# Patient Record
Sex: MALE | Race: OTHER | Employment: UNEMPLOYED
[De-identification: names, ages, dates, MRNs, and addresses within clinical notes are randomized per-mention and may not be internally consistent; named-entity substitution may affect disease eponyms.]

---

## 2024-06-16 ENCOUNTER — HOSPITAL ENCOUNTER (EMERGENCY)
Facility: HOSPITAL | Age: 39
Discharge: HOME | End: 2024-06-17
Attending: EMERGENCY MEDICINE

## 2024-06-16 ENCOUNTER — APPOINTMENT (OUTPATIENT)
Facility: HOSPITAL | Age: 39
End: 2024-06-16

## 2024-06-16 VITALS
TEMPERATURE: 98.6 F | WEIGHT: 171.96 LBS | DIASTOLIC BLOOD PRESSURE: 77 MMHG | OXYGEN SATURATION: 98 % | SYSTOLIC BLOOD PRESSURE: 124 MMHG | HEART RATE: 63 BPM | RESPIRATION RATE: 17 BRPM

## 2024-06-16 DIAGNOSIS — S83.91XA SPRAIN OF RIGHT KNEE, UNSPECIFIED LIGAMENT, INITIAL ENCOUNTER: Primary | ICD-10-CM

## 2024-06-16 PROCEDURE — 99283 EMERGENCY DEPT VISIT LOW MDM: CPT

## 2024-06-16 PROCEDURE — 73562 X-RAY EXAM OF KNEE 3: CPT

## 2024-06-16 ASSESSMENT — PAIN SCALES - GENERAL: PAINLEVEL_OUTOF10: 8

## 2024-06-16 ASSESSMENT — LIFESTYLE VARIABLES
HOW OFTEN DO YOU HAVE A DRINK CONTAINING ALCOHOL: NEVER
HOW MANY STANDARD DRINKS CONTAINING ALCOHOL DO YOU HAVE ON A TYPICAL DAY: PATIENT DOES NOT DRINK

## 2024-06-16 ASSESSMENT — PAIN - FUNCTIONAL ASSESSMENT: PAIN_FUNCTIONAL_ASSESSMENT: 0-10

## 2024-06-16 ASSESSMENT — PAIN DESCRIPTION - ORIENTATION: ORIENTATION: RIGHT

## 2024-06-16 ASSESSMENT — PAIN DESCRIPTION - LOCATION: LOCATION: KNEE;ANKLE

## 2024-06-17 RX ORDER — ETODOLAC 200 MG/1
200 CAPSULE ORAL 2 TIMES DAILY
Qty: 10 CAPSULE | Refills: 0 | Status: SHIPPED | OUTPATIENT
Start: 2024-06-17 | End: 2024-06-22

## 2024-06-17 RX ORDER — CYCLOBENZAPRINE HCL 5 MG
10 TABLET ORAL 3 TIMES DAILY PRN
Qty: 20 TABLET | Refills: 0 | Status: SHIPPED | OUTPATIENT
Start: 2024-06-17 | End: 2024-06-22

## 2024-06-17 NOTE — DISCHARGE INSTRUCTIONS
Routine appointments for health maintenance with a primary care provider are very important and emergency department visits are no substitute.  You should review all findings and test results from your visit today with your primary care physician.        We recommended that you take medications as prescribed.    You may take 1 or 2 pills of Tylenol every 6 hours as needed for pain or fever.  You should not take this medication with other medications that contain acetaminophen/Tylenol which could include prescription pain medications or other combo over-the-counter medications.  You should not exceed more than 4000 mg in a 24 hour.     Please do not drive, operate heavy machinery, swim, bathe or perform any other activities as you may risk injury to yourself or others.    Please try to rest, use ice, compression and elevation to help with symptoms.    Use ice every 2 hours for 20 minutes to help alleviate inflammation and pain.        Return to the emergency department for any new or concerning signs/symptoms or failure to improve.    ----------    Las citas de rutina con un proveedor de atención primaria para el mantenimiento de la ruben son muy importantes y las visitas al departamento de emergencias no son un sustituto.  Debe revisar todos los hallazgos y resultados de las pruebas de stevens visita de hoy con stevens médico de atención primaria.        Le recomendamos que tome los medicamentos según lo recetado.    Puede marta 1 o 2 pastillas de Tylenol cada 6 horas según sea necesario para el dolor o la fiebre.  No debe marta dulce medicamento con otros medicamentos que contengan acetaminofén/Tylenol, que podrían incluir analgésicos recetados u otros medicamentos combinados de venta hansel.  No se deben exceder más de 4000 mg en 24 horas.     No conduzca, opere maquinaria pesada, nade, se bañe ni realice ninguna otra actividad, ya que puede correr el riesgo de lesionarse a usted mismo o a otros.    Intente descansar, use

## 2024-06-17 NOTE — ED PROVIDER NOTES
EMERGENCY DEPARTMENT PHYSICIAN NOTE     Patient: Leonid Burr     Time of Service: 6/16/2024  8:25 PM     Chief complaint:   Chief Complaint   Patient presents with    Knee Pain    Ankle Pain        HISTORY:  Patient is a 38 y.o. male who presents to the emergency department with complaints of right knee pain.       No past medical history on file.     No past surgical history on file.     No family history on file.     Social History     Socioeconomic History    Marital status: Single        Current Medications: Reviewed in chart.    Allergies: No Known Allergies       REVIEW OF SYSTEMS: See HPI for pertinent positives and negatives.      PHYSICAL EXAM:  /77   Pulse 63   Temp 98.6 °F (37 °C) (Oral)   Resp 17   Wt 78 kg (171 lb 15.3 oz)   SpO2 98%    Physical Exam  Vitals and nursing note reviewed.   Constitutional:       General: He is not in acute distress.     Appearance: Normal appearance. He is normal weight. He is not toxic-appearing.   HENT:      Head: Normocephalic and atraumatic.      Nose: Nose normal.      Mouth/Throat:      Mouth: Mucous membranes are moist.      Pharynx: Oropharynx is clear.   Eyes:      Extraocular Movements: Extraocular movements intact.      Conjunctiva/sclera: Conjunctivae normal.      Pupils: Pupils are equal, round, and reactive to light.   Cardiovascular:      Rate and Rhythm: Normal rate and regular rhythm.      Pulses: Normal pulses.      Heart sounds: Normal heart sounds.   Pulmonary:      Effort: Pulmonary effort is normal.      Breath sounds: Normal breath sounds. No wheezing.   Abdominal:      General: Abdomen is flat. Bowel sounds are normal.      Palpations: Abdomen is soft.      Tenderness: There is no abdominal tenderness. There is no guarding.   Musculoskeletal:         General: Swelling and tenderness present. No deformity or signs of injury. Normal range of motion.      Cervical back: Normal range of motion and neck supple. No rigidity or

## 2024-06-17 NOTE — ED TRIAGE NOTES
Session: [id number]   : [id number]  Mana    Pt to triage in crutches c/o right knee pain and sprained ankle from a soccer game last night. He woke up this AM to more pain and he cannot bear weight on it.